# Patient Record
Sex: FEMALE | Race: BLACK OR AFRICAN AMERICAN | NOT HISPANIC OR LATINO | Employment: FULL TIME | URBAN - METROPOLITAN AREA
[De-identification: names, ages, dates, MRNs, and addresses within clinical notes are randomized per-mention and may not be internally consistent; named-entity substitution may affect disease eponyms.]

---

## 2018-06-14 ENCOUNTER — LAB VISIT (OUTPATIENT)
Dept: LAB | Facility: HOSPITAL | Age: 38
End: 2018-06-14
Attending: FAMILY MEDICINE
Payer: COMMERCIAL

## 2018-06-14 ENCOUNTER — OFFICE VISIT (OUTPATIENT)
Dept: INTERNAL MEDICINE | Facility: CLINIC | Age: 38
End: 2018-06-14
Payer: COMMERCIAL

## 2018-06-14 VITALS
SYSTOLIC BLOOD PRESSURE: 124 MMHG | TEMPERATURE: 99 F | HEART RATE: 131 BPM | HEIGHT: 67 IN | BODY MASS INDEX: 45.99 KG/M2 | RESPIRATION RATE: 18 BRPM | WEIGHT: 293 LBS | OXYGEN SATURATION: 99 % | DIASTOLIC BLOOD PRESSURE: 78 MMHG

## 2018-06-14 DIAGNOSIS — E66.01 SEVERE OBESITY (BMI >= 40): ICD-10-CM

## 2018-06-14 DIAGNOSIS — Z20.2 EXPOSURE TO STD: ICD-10-CM

## 2018-06-14 DIAGNOSIS — K21.9 GASTROESOPHAGEAL REFLUX DISEASE WITHOUT ESOPHAGITIS: ICD-10-CM

## 2018-06-14 DIAGNOSIS — R52 PAIN: ICD-10-CM

## 2018-06-14 DIAGNOSIS — B37.31 VAGINAL YEAST INFECTION: ICD-10-CM

## 2018-06-14 DIAGNOSIS — N76.0 ACUTE VAGINITIS: Primary | ICD-10-CM

## 2018-06-14 PROCEDURE — 86703 HIV-1/HIV-2 1 RESULT ANTBDY: CPT

## 2018-06-14 PROCEDURE — 86592 SYPHILIS TEST NON-TREP QUAL: CPT

## 2018-06-14 PROCEDURE — 99999 PR PBB SHADOW E&M-NEW PATIENT-LVL III: CPT | Mod: PBBFAC,,, | Performed by: FAMILY MEDICINE

## 2018-06-14 PROCEDURE — 36415 COLL VENOUS BLD VENIPUNCTURE: CPT | Mod: PO

## 2018-06-14 PROCEDURE — 99204 OFFICE O/P NEW MOD 45 MIN: CPT | Mod: S$GLB,,, | Performed by: FAMILY MEDICINE

## 2018-06-14 PROCEDURE — 3074F SYST BP LT 130 MM HG: CPT | Mod: CPTII,S$GLB,, | Performed by: FAMILY MEDICINE

## 2018-06-14 PROCEDURE — 3008F BODY MASS INDEX DOCD: CPT | Mod: CPTII,S$GLB,, | Performed by: FAMILY MEDICINE

## 2018-06-14 PROCEDURE — 3078F DIAST BP <80 MM HG: CPT | Mod: CPTII,S$GLB,, | Performed by: FAMILY MEDICINE

## 2018-06-14 PROCEDURE — 80074 ACUTE HEPATITIS PANEL: CPT

## 2018-06-14 RX ORDER — OMEPRAZOLE 20 MG/1
CAPSULE, DELAYED RELEASE ORAL
Refills: 1 | COMMUNITY
Start: 2018-04-29 | End: 2018-06-14 | Stop reason: SDUPTHER

## 2018-06-14 RX ORDER — FLUCONAZOLE 150 MG/1
150 TABLET ORAL DAILY
Qty: 1 TABLET | Refills: 2 | Status: SHIPPED | OUTPATIENT
Start: 2018-06-14 | End: 2018-06-15

## 2018-06-14 RX ORDER — METFORMIN HYDROCHLORIDE 500 MG/1
TABLET ORAL
Refills: 4 | COMMUNITY
Start: 2018-04-29 | End: 2018-07-05 | Stop reason: SDUPTHER

## 2018-06-14 RX ORDER — TRAMADOL HYDROCHLORIDE 50 MG/1
TABLET ORAL
Refills: 1 | COMMUNITY
Start: 2018-04-29 | End: 2018-06-14 | Stop reason: SDUPTHER

## 2018-06-14 RX ORDER — TRAMADOL HYDROCHLORIDE 50 MG/1
TABLET ORAL
Qty: 30 TABLET | Refills: 1 | Status: SHIPPED | OUTPATIENT
Start: 2018-06-14 | End: 2018-08-02 | Stop reason: SDUPTHER

## 2018-06-14 RX ORDER — OMEPRAZOLE 20 MG/1
20 CAPSULE, DELAYED RELEASE ORAL DAILY
Qty: 30 CAPSULE | Refills: 1 | Status: SHIPPED | OUTPATIENT
Start: 2018-06-14

## 2018-06-14 NOTE — PROGRESS NOTES
Subjective:       Patient ID: More Madrid is a 38 y.o. female.    Chief Complaint: Establish Care; Exposure to STD (Herpes); and Vaginitis    37 yo female here with concern about STD exposure. She has had unprotected sex with her partner who has recently been diagnosed with genital herpes after sex with a different partner. Ms. Madrid has not had any symptoms of herpes although she does feel like she may have a yeast infection. C/o increased discharge and itching.       Exposure to STD    The patient's primary symptoms include a discharge and genital itching. The patient's pertinent negatives include no genital lesions, genital rash or pelvic pain. This is a new problem. The current episode started in the past 7 days. The problem has been unchanged. The vaginal discharge was milky. Pertinent negatives include no abdominal pain, fever, genital odor or urinary frequency. She has tried nothing for the symptoms.      does not have any pertinent problems on file.  Past Medical History:   Diagnosis Date    Dysmenorrhea     Hypertension     Ovarian cyst      Past Surgical History:   Procedure Laterality Date    cyst removal from ovary       Family History   Problem Relation Age of Onset    Hypertension Mother     Hypertension Father     Cancer Maternal Aunt         lung, cervical     Social History     Social History    Marital status: Single     Spouse name: N/A    Number of children: 0    Years of education: N/A     Occupational History    [employer]     Social History Main Topics    Smoking status: Never Smoker    Smokeless tobacco: Not on file    Alcohol use Yes    Drug use: No    Sexual activity: Yes     Partners: Male     Birth control/ protection: None     Other Topics Concern    Not on file     Social History Narrative    Lives with mom     Review of Systems   Constitutional: Negative for chills and fever.   Gastrointestinal: Negative for abdominal pain.   Genitourinary: Positive for menstrual  problem and vaginal discharge. Negative for frequency, genital sores, pelvic pain, urgency and vaginal bleeding.   All other systems reviewed and are negative.      Objective:     Vitals:    06/14/18 1321   BP: 124/78   Pulse: (!) 131   Resp: 18   Temp: 98.9 °F (37.2 °C)        Physical Exam   Constitutional: She is oriented to person, place, and time. She appears well-developed and well-nourished.   HENT:   Head: Normocephalic and atraumatic.   Right Ear: External ear normal.   Left Ear: External ear normal.   Nose: Nose normal.   Eyes: Conjunctivae and EOM are normal. Pupils are equal, round, and reactive to light. No scleral icterus.   Genitourinary: Vaginal discharge found.   Genitourinary Comments: No lesions of vulva or at introitus; thick white vaginal discharge present   Neurological: She is alert and oriented to person, place, and time.   Normal gait. No speech abnormality   Psychiatric: She has a normal mood and affect. Her behavior is normal. Judgment and thought content normal.   Nursing note and vitals reviewed.      Assessment:       1. Acute vaginitis    2. Exposure to STD    3. Vaginal yeast infection    4. Pain    5. Gastroesophageal reflux disease without esophagitis    6. Severe obesity (BMI >= 40)        Plan:           Problem List Items Addressed This Visit     Severe obesity (BMI >= 40)      Other Visit Diagnoses     Acute vaginitis    -  Primary    Relevant Orders    HSV by Rapid PCR, Non-Blood [facility]; Vagina    Vaginal Screen Vagina (Completed)    Exposure to STD        Relevant Orders    HSV by Rapid PCR, Non-Blood [facility]; Vagina    C. trachomatis/N. gonorrhoeae by AMP DNA Urine    Hepatitis panel, acute    HIV-1 and HIV-2 antibodies    RPR    Vaginal yeast infection        Relevant Medications    fluconazole (DIFLUCAN) 150 MG Tab    Pain        Relevant Medications    traMADol (ULTRAM) 50 mg tablet    Gastroesophageal reflux disease without esophagitis        Relevant Medications     omeprazole (PRILOSEC) 20 MG capsule      Screening for STDs today. She would like to establish primary care. RTC 2 weeks for wellness visit.

## 2018-06-15 LAB
HAV IGM SERPL QL IA: NEGATIVE
HBV CORE IGM SERPL QL IA: NEGATIVE
HBV SURFACE AG SERPL QL IA: NEGATIVE
HCV AB SERPL QL IA: NEGATIVE
HIV 1+2 AB+HIV1 P24 AG SERPL QL IA: NEGATIVE
RPR SER QL: NORMAL

## 2018-06-21 ENCOUNTER — PATIENT OUTREACH (OUTPATIENT)
Dept: ADMINISTRATIVE | Facility: HOSPITAL | Age: 38
End: 2018-06-21

## 2018-07-05 ENCOUNTER — OFFICE VISIT (OUTPATIENT)
Dept: INTERNAL MEDICINE | Facility: CLINIC | Age: 38
End: 2018-07-05
Payer: COMMERCIAL

## 2018-07-05 ENCOUNTER — LAB VISIT (OUTPATIENT)
Dept: LAB | Facility: HOSPITAL | Age: 38
End: 2018-07-05
Attending: FAMILY MEDICINE
Payer: COMMERCIAL

## 2018-07-05 VITALS
SYSTOLIC BLOOD PRESSURE: 124 MMHG | DIASTOLIC BLOOD PRESSURE: 68 MMHG | TEMPERATURE: 97 F | WEIGHT: 293 LBS | HEIGHT: 67 IN | HEART RATE: 124 BPM | BODY MASS INDEX: 45.99 KG/M2 | OXYGEN SATURATION: 97 %

## 2018-07-05 DIAGNOSIS — E66.01 SEVERE OBESITY (BMI >= 40): ICD-10-CM

## 2018-07-05 DIAGNOSIS — Z13.220 SCREENING CHOLESTEROL LEVEL: ICD-10-CM

## 2018-07-05 DIAGNOSIS — Z12.4 CERVICAL CANCER SCREENING: Primary | ICD-10-CM

## 2018-07-05 DIAGNOSIS — Z13.1 SCREENING FOR DIABETES MELLITUS: ICD-10-CM

## 2018-07-05 DIAGNOSIS — E28.2 PCOS (POLYCYSTIC OVARIAN SYNDROME): ICD-10-CM

## 2018-07-05 DIAGNOSIS — Z20.2 EXPOSURE TO STD: ICD-10-CM

## 2018-07-05 DIAGNOSIS — E88.819 INSULIN RESISTANCE: ICD-10-CM

## 2018-07-05 DIAGNOSIS — I10 ESSENTIAL HYPERTENSION: ICD-10-CM

## 2018-07-05 DIAGNOSIS — R63.5 WEIGHT GAIN: ICD-10-CM

## 2018-07-05 DIAGNOSIS — B00.9 HERPES SIMPLEX TYPE 2 INFECTION: ICD-10-CM

## 2018-07-05 LAB
ALBUMIN SERPL BCP-MCNC: 3.2 G/DL
ALP SERPL-CCNC: 105 U/L
ALT SERPL W/O P-5'-P-CCNC: 37 U/L
ANION GAP SERPL CALC-SCNC: 8 MMOL/L
AST SERPL-CCNC: 37 U/L
BILIRUB SERPL-MCNC: 0.3 MG/DL
BUN SERPL-MCNC: 11 MG/DL
CALCIUM SERPL-MCNC: 9.8 MG/DL
CHLORIDE SERPL-SCNC: 102 MMOL/L
CHOLEST SERPL-MCNC: 175 MG/DL
CHOLEST/HDLC SERPL: 3.1 {RATIO}
CO2 SERPL-SCNC: 25 MMOL/L
CREAT SERPL-MCNC: 0.8 MG/DL
EST. GFR  (AFRICAN AMERICAN): >60 ML/MIN/1.73 M^2
EST. GFR  (NON AFRICAN AMERICAN): >60 ML/MIN/1.73 M^2
ESTIMATED AVG GLUCOSE: 206 MG/DL
GLUCOSE SERPL-MCNC: 238 MG/DL
HBA1C MFR BLD HPLC: 8.8 %
HDLC SERPL-MCNC: 57 MG/DL
HDLC SERPL: 32.6 %
LDLC SERPL CALC-MCNC: 75.2 MG/DL
NONHDLC SERPL-MCNC: 118 MG/DL
POTASSIUM SERPL-SCNC: 4.4 MMOL/L
PROT SERPL-MCNC: 7.4 G/DL
SODIUM SERPL-SCNC: 135 MMOL/L
TRIGL SERPL-MCNC: 214 MG/DL
TSH SERPL DL<=0.005 MIU/L-ACNC: 2.58 UIU/ML

## 2018-07-05 PROCEDURE — 3078F DIAST BP <80 MM HG: CPT | Mod: CPTII,S$GLB,, | Performed by: FAMILY MEDICINE

## 2018-07-05 PROCEDURE — 3074F SYST BP LT 130 MM HG: CPT | Mod: CPTII,S$GLB,, | Performed by: FAMILY MEDICINE

## 2018-07-05 PROCEDURE — 36415 COLL VENOUS BLD VENIPUNCTURE: CPT | Mod: PO

## 2018-07-05 PROCEDURE — 99395 PREV VISIT EST AGE 18-39: CPT | Mod: S$GLB,,, | Performed by: FAMILY MEDICINE

## 2018-07-05 PROCEDURE — 80053 COMPREHEN METABOLIC PANEL: CPT

## 2018-07-05 PROCEDURE — 99999 PR PBB SHADOW E&M-EST. PATIENT-LVL IV: CPT | Mod: PBBFAC,,, | Performed by: FAMILY MEDICINE

## 2018-07-05 PROCEDURE — 83036 HEMOGLOBIN GLYCOSYLATED A1C: CPT

## 2018-07-05 PROCEDURE — 80061 LIPID PANEL: CPT

## 2018-07-05 PROCEDURE — 84443 ASSAY THYROID STIM HORMONE: CPT

## 2018-07-05 RX ORDER — METFORMIN HYDROCHLORIDE 500 MG/1
500 TABLET ORAL 2 TIMES DAILY WITH MEALS
Qty: 60 TABLET | Refills: 6 | Status: SHIPPED | OUTPATIENT
Start: 2018-07-05 | End: 2018-07-10 | Stop reason: SDUPTHER

## 2018-07-05 RX ORDER — ACYCLOVIR 400 MG/1
TABLET ORAL
Refills: 0 | COMMUNITY
Start: 2018-06-23 | End: 2018-09-07

## 2018-07-05 NOTE — PROGRESS NOTES
Subjective:       Patient ID: More Madrid is a 38 y.o. female.    Chief Complaint: Follow-up    37 yo female here for wellness visit. She went to the ER due to first herpes outbreak. She has not had a pap smear recently. Interested in birth control but does not like medication. Wants an IUD if insurance will cover. Wants to lose weight--wants bariatric surgery. Has tried diet medication from a weight loss clinic and has also tried exercise and diet modification. Thinking of trying keto diet. 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        does not have any pertinent problems on file.  Past Medical History:   Diagnosis Date    Dysmenorrhea     Hypertension     Ovarian cyst      Past Surgical History:   Procedure Laterality Date    cyst removal from ovary       Family History   Problem Relation Age of Onset    Hypertension Mother     Hypertension Father     Cancer Maternal Aunt         lung, cervical     Social History     Social History    Marital status: Single     Spouse name: N/A    Number of children: 0    Years of education: N/A     Occupational History    [employer]     Social History Main Topics    Smoking status: Never Smoker    Smokeless tobacco: Not on file    Alcohol use Yes    Drug use: No    Sexual activity: Yes     Partners: Male     Birth control/ protection: None     Other Topics Concern    Not on file     Social History Narrative    Lives with mom     Review of Systems   Constitutional: Positive for fatigue. Negative for activity change and unexpected weight change.   HENT: Negative for hearing loss, rhinorrhea and trouble swallowing.    Eyes: Negative for discharge and visual disturbance.   Respiratory: Negative for chest tightness and wheezing.    Cardiovascular: Negative for chest pain and palpitations.   Gastrointestinal: Negative for blood in stool, constipation, diarrhea and vomiting.   Endocrine: Negative for polydipsia and polyuria.   Genitourinary:  Negative for difficulty urinating, dysuria, hematuria and menstrual problem.   Musculoskeletal: Negative for arthralgias, joint swelling and neck pain.   Neurological: Negative for weakness and headaches.   Psychiatric/Behavioral: Negative for confusion and dysphoric mood.       Objective:     Vitals:    07/05/18 0812   BP: 124/68   Pulse: (!) 124   Temp: 97.3 °F (36.3 °C)        Physical Exam   Constitutional: She is oriented to person, place, and time. She appears well-developed and well-nourished.   Morbidly obese   HENT:   Head: Normocephalic and atraumatic.   Right Ear: External ear normal.   Left Ear: External ear normal.   Nose: Nose normal.   Eyes: Conjunctivae and EOM are normal. Pupils are equal, round, and reactive to light. No scleral icterus.   Cardiovascular: Regular rhythm, normal heart sounds and intact distal pulses.    No murmur heard.  Pulmonary/Chest: Effort normal and breath sounds normal.   Musculoskeletal: Normal range of motion. She exhibits no edema or deformity.   Neurological: She is alert and oriented to person, place, and time.   Normal gait. No speech abnormality   Psychiatric: She has a normal mood and affect. Her behavior is normal. Judgment and thought content normal.   Nursing note and vitals reviewed.      Assessment:       1. Cervical cancer screening    2. Weight gain    3. Screening cholesterol level    4. Screening for diabetes mellitus    5. Insulin resistance    6. Severe obesity (BMI >= 40)    7. PCOS (polycystic ovarian syndrome)    8. Essential hypertension    9. Herpes simplex type 2 infection        Plan:           Problem List Items Addressed This Visit     Hypertension    Current Assessment & Plan     Normal BP today; not on antihypertensive therapy. Check CMP. Monitor BP. Monitor salt intake         PCOS (polycystic ovarian syndrome)    Current Assessment & Plan     Metformin refilled; advised that this may stimulate ovulation in anovulatory patients with PCOS and to  consider this as pregnancy is not desired.          Severe obesity (BMI >= 40)    Current Assessment & Plan     Info given on free group meeting on weight loss as well as info on bariatric surgery. Counseled on weight loss options.          Relevant Orders    TSH    Herpes simplex type 2 infection    Overview     First outbreak June 2018; diagnosed in [facility]         Current Assessment & Plan     Complete course of acyclovir; call or come in if additional outbreak occurs.          Insulin resistance    Relevant Medications    metFORMIN (GLUCOPHAGE) 500 MG tablet    Other Relevant Orders    TSH    Comprehensive metabolic panel      Other Visit Diagnoses     Cervical cancer screening    -  Primary    Relevant Orders    Ambulatory referral to Obstetrics / Gynecology    Weight gain        Screening cholesterol level        Relevant Orders    Lipid panel    Screening for diabetes mellitus        Relevant Orders    Hemoglobin A1c

## 2018-07-05 NOTE — PATIENT INSTRUCTIONS
Ask your OB/GYN about getting an IUD for birth control.      Deciding on Bariatric Surgery    Is excess weight affecting your life and your health? Bariatric surgery (also called obesity surgery) may help you reach a healthier weight. This surgery alters your digestive system. For the surgery to work, you must change your diet and lifestyle. In most cases, the surgery is not reversible. So if youre considering surgery, learn all you can about it before you decide. Bariatric surgery also has a number of potential risks and complications that you need to discuss with your surgeon.  Qualifying for surgery  Surgery is not for everyone. To qualify:  · You must have a BMI of 40 or more, or a BMI of 35 or more (see box below) plus a serious obesity-related health problem, such as type 2 diabetes, high blood pressure, or sleep apnea.  · You must be healthy enough to have surgery.  · You may be required to have a psychological evaluation.  · You must have tried to lose weight by other means, such as dieting.  Setting realistic expectations  The goal of bariatric surgery is to help you lose over half of your excess weight. This can improve or prevent health problems. This surgery is not done for cosmetic reasons. Keep in mind that:  · Other weight-loss methods should be tried first. Lifestyle changes, behavioral modifications, and prescription medicines are initial choices. Surgery is only a choice if other methods dont work.  · Surgery is meant to be permanent. You will need to change how you eat for the rest of your life.  · You must commit to eating less and being more active after surgery. If you dont, you will not lose or keep off the weight.  · You wont reach a healthy weight right away. Most weight is lost steadily during the first year or two after surgery.  · Most likely, you wont lose all your excess weight. But you can reach a much healthier weight.  Obesity is measured by a formula called body mass index  (BMI), which is based on your height and weight. A healthy BMI is about 18 to 25. A BMI of 30 or more signals obesity. A BMI of 40 or more reflects severe (morbid) obesity.   Resources  · American Society for Metabolic and Bariatric Surgerywww.asmbs.org  · National Heart, Lung, and Blood [place] Obesity Education Initiativewww.nhlbi.nih.gov/health/public/heart/obesity/lose_wt  Date Last Reviewed: 2/17/2016  © 5389-5551 [employer]. [address] [place], [place] [id number]. All rights reserved. This information is not intended as a substitute for professional medical care. Always follow your healthcare professional's instructions.

## 2018-07-05 NOTE — ASSESSMENT & PLAN NOTE
Info given on free group meeting on weight loss as well as info on bariatric surgery. Counseled on weight loss options.

## 2018-07-05 NOTE — ASSESSMENT & PLAN NOTE
Metformin refilled; advised that this may stimulate ovulation in anovulatory patients with PCOS and to consider this as pregnancy is not desired.

## 2018-07-10 DIAGNOSIS — E11.9 TYPE 2 DIABETES MELLITUS WITHOUT COMPLICATION, WITHOUT LONG-TERM CURRENT USE OF INSULIN: Primary | ICD-10-CM

## 2018-07-10 DIAGNOSIS — E88.819 INSULIN RESISTANCE: ICD-10-CM

## 2018-07-10 RX ORDER — METFORMIN HYDROCHLORIDE 500 MG/1
1000 TABLET ORAL 2 TIMES DAILY WITH MEALS
Qty: 60 TABLET | Refills: 6 | Status: SHIPPED | OUTPATIENT
Start: 2018-07-10 | End: 2018-07-11 | Stop reason: SDUPTHER

## 2018-07-10 NOTE — PROGRESS NOTES
Spoke with patient to discuss new diagnosis of diabetes. She will increase metformin to 1000 mg BID and will refer to diabetes education program for education.

## 2018-07-11 ENCOUNTER — TELEPHONE (OUTPATIENT)
Dept: DIABETES | Facility: CLINIC | Age: 38
End: 2018-07-11

## 2018-07-11 ENCOUNTER — OFFICE VISIT (OUTPATIENT)
Dept: OBSTETRICS AND GYNECOLOGY | Facility: CLINIC | Age: 38
End: 2018-07-11
Payer: COMMERCIAL

## 2018-07-11 VITALS
DIASTOLIC BLOOD PRESSURE: 84 MMHG | WEIGHT: 293 LBS | SYSTOLIC BLOOD PRESSURE: 130 MMHG | BODY MASS INDEX: 45.99 KG/M2 | HEIGHT: 67 IN

## 2018-07-11 DIAGNOSIS — N92.6 IRREGULAR MENSES: Primary | ICD-10-CM

## 2018-07-11 DIAGNOSIS — B37.31 YEAST VAGINITIS: ICD-10-CM

## 2018-07-11 DIAGNOSIS — E28.2 PCOS (POLYCYSTIC OVARIAN SYNDROME): ICD-10-CM

## 2018-07-11 DIAGNOSIS — E11.9 TYPE 2 DIABETES MELLITUS WITHOUT COMPLICATION, WITHOUT LONG-TERM CURRENT USE OF INSULIN: ICD-10-CM

## 2018-07-11 PROCEDURE — 99204 OFFICE O/P NEW MOD 45 MIN: CPT | Mod: S$GLB,,, | Performed by: NURSE PRACTITIONER

## 2018-07-11 PROCEDURE — 81025 URINE PREGNANCY TEST: CPT | Mod: S$GLB,,, | Performed by: NURSE PRACTITIONER

## 2018-07-11 PROCEDURE — 99999 PR PBB SHADOW E&M-EST. PATIENT-LVL III: CPT | Mod: PBBFAC,,, | Performed by: NURSE PRACTITIONER

## 2018-07-11 PROCEDURE — 3075F SYST BP GE 130 - 139MM HG: CPT | Mod: CPTII,S$GLB,, | Performed by: NURSE PRACTITIONER

## 2018-07-11 PROCEDURE — 3008F BODY MASS INDEX DOCD: CPT | Mod: CPTII,S$GLB,, | Performed by: NURSE PRACTITIONER

## 2018-07-11 PROCEDURE — 87210 SMEAR WET MOUNT SALINE/INK: CPT | Mod: QW,S$GLB,, | Performed by: NURSE PRACTITIONER

## 2018-07-11 PROCEDURE — 3079F DIAST BP 80-89 MM HG: CPT | Mod: CPTII,S$GLB,, | Performed by: NURSE PRACTITIONER

## 2018-07-11 RX ORDER — MEDROXYPROGESTERONE ACETATE 10 MG/1
10 TABLET ORAL DAILY
Qty: 10 TABLET | Refills: 12 | Status: SHIPPED | OUTPATIENT
Start: 2018-07-11 | End: 2018-07-11 | Stop reason: SDUPTHER

## 2018-07-11 RX ORDER — METFORMIN HYDROCHLORIDE 500 MG/1
1000 TABLET ORAL 2 TIMES DAILY WITH MEALS
Qty: 60 TABLET | Refills: 6 | Status: SHIPPED | OUTPATIENT
Start: 2018-07-11 | End: 2018-08-02

## 2018-07-11 RX ORDER — NYSTATIN AND TRIAMCINOLONE ACETONIDE 100000; 1 [USP'U]/G; MG/G
CREAM TOPICAL
Qty: 30 G | Refills: 1 | Status: SHIPPED | OUTPATIENT
Start: 2018-07-11 | End: 2018-09-07

## 2018-07-11 RX ORDER — MEDROXYPROGESTERONE ACETATE 10 MG/1
10 TABLET ORAL DAILY
Qty: 10 TABLET | Refills: 12 | Status: SHIPPED | OUTPATIENT
Start: 2018-07-11 | End: 2018-07-21

## 2018-07-11 RX ORDER — FLUCONAZOLE 150 MG/1
TABLET ORAL
Qty: 2 TABLET | Refills: 1 | Status: SHIPPED | OUTPATIENT
Start: 2018-07-11 | End: 2018-09-07

## 2018-07-11 RX ORDER — FLUCONAZOLE 150 MG/1
TABLET ORAL
Qty: 2 TABLET | Refills: 1 | Status: SHIPPED | OUTPATIENT
Start: 2018-07-11 | End: 2018-07-11 | Stop reason: SDUPTHER

## 2018-07-11 RX ORDER — NYSTATIN AND TRIAMCINOLONE ACETONIDE 100000; 1 [USP'U]/G; MG/G
CREAM TOPICAL
Qty: 30 G | Refills: 1 | Status: SHIPPED | OUTPATIENT
Start: 2018-07-11 | End: 2018-07-11 | Stop reason: SDUPTHER

## 2018-07-11 NOTE — PATIENT INSTRUCTIONS
Polycystic Ovary Syndrome  Polycystic ovary syndrome (PCOS) causes harmless, small cysts in the ovaries and other symptoms. PCOS is caused by certain hormones being out of balance. The word syndrome means a group of symptoms. Women with PCOS may have no periods, irregular periods, or very long periods.    Your ovaries  Women store their eggs in their ovaries. Each egg is in a capsule called a follicle. Normally during the reproductive years, one follicle grows to produce a mature egg each month. This egg is released during ovulation and the follicle dissolves.  Hormones out of balance  With polycystic ovary syndrome (PCOS), the hormones that control ovulation are out of balance. These include estrogen, progesterones, and androgen. As a result, ovulation may not occur. Instead, the follicle stays enlarged. This is a fluid-filled sac (cyst). Over time, the ovaries fill with many small cysts. This is why they are called poly (many) cystic ovaries. In some women, the ovaries also make too much androgen.  PCOS symptoms  Women with PCOS may also have one or more of these symptoms:  · Trouble getting pregnant (fertility problems)  · Weight gain, especially around the waist   · Acne  · Hair growth on the face and other parts of the body  · Patches of thickened, velvety, darkened skin called acanthosis nigricans  Women with PCOS are also at increased risk of developing cancer of the uterine lining, diabetes, and heart disease.   Date Last Reviewed: 5/10/2015  © 7517-4867 [employer]. [address] [place] [address]. All rights reserved. This information is not intended as a substitute for professional medical care. Always follow your healthcare professional's instructions.        Understanding Periods  Having a period is a normal, healthy part of becoming and being a woman. A period is the result of a cycle that takes place inside a girls body. This menstrual cycle makes it possible for women  to have babies. The cycle begins with the first day of bleeding. In the middle of the cycle, ovulation occurs. This is when an egg is released and begins its journey from the ovary to the uterus.    An egg is released  · During each cycle, 1 egg grows and is released from an ovary. It finds its way to the fallopian tube.  The egg travels through a tube  · The egg moves through the fallopian tube toward the uterus. (If the egg and a mans sperm meet here, a woman becomes pregnant.)  The lining thickens  · The lining of the uterus grows thicker. This lining is made up of blood, tissue, and fluid. (The lining will nourish a growing baby during pregnancy.)  The egg and lining are shed  · About once a month, the egg and the lining of the uterus are shed through the vagina. This is called a period. (A period does not happen during pregnancy.)  Date Last Reviewed: 5/9/2015  © 7645-7200 [employer]. [address] [place] [address], [place] [address]. All rights reserved. This information is not intended as a substitute for professional medical care. Always follow your healthcare professional's instructions.        Understanding Uterine Bleeding  Your uterine bleeding may be heavy. Or you may have bleeding between periods. These problems may be caused by hormonal imbalance. Or they can be caused by uterine growths, an intrauterine device (IUD), bleeding disorder, or pregnancy.  Hormonal imbalance  Your menstrual cycle is controlled by hormones. The hormones include estrogen and progesterone. Sometimes there is too much or too little of 1 or both of these hormones. This can cause heavy periods. Or it can cause bleeding between periods. Causes of hormonal imbalance can include:  · Hormonal changes in teens and in women nearing menopause  · Diabetes, thyroid disease, or other medical problems  · Obesity  · Stress  · Strenuous exercise  · Anorexia (an eating disorder)  · Pregnancy  Uterine growths  There are different kinds  of uterine growths. These include:  · Fibroids. These are round knots of muscle tissue in the uterus.  · Polyps. These are soft tissue growths in the uterine lining. They often hang into the uterus.  · Adenomyosis. This is when the uterine lining grows into the muscle wall.  · Hyperplasia. This is when the uterine lining gets too thick or grows too much.  · Endometrial cancer. This is uncontrolled growth of part of the uterine lining.  Other causes of uterine bleeding  There are other causes of uterine bleeding. These include:  · IUD (intrauterine device). This is a method of birth control. Some IUDs contain hormones.  · Bleeding disorders. This is when the blood can't clot properly.  Treatment  Your health care provider can help diagnose the cause of your bleeding problem. He or she will work then work with you to plan treatment as needed.  Date Last Reviewed: 7/6/2015  © 1375-4584 [employer]. [address] [place] [address]. All rights reserved. This information is not intended as a substitute for professional medical care. Always follow your healthcare professional's instructions.        Vaginal Infection: Yeast (Candidiasis)  Yeast infection occurs when yeast in the vagina increase and attacks the vaginal tissues. Yeast is a type of fungus. These infections are often caused by a type of yeast called Candida albicans. Other species of yeast can also cause infections. Factors that may make infection more likely include recent antibiotic use, douching, or increased sex. Yeast infections are more common in women who have diabetes, or are obese or pregnant, or have a weak immune system.  Symptoms of yeast infection  · Clumpy or thin, white discharge, which may look like cottage cheese  · No odor or minimal odor  · Severe vaginal itching or burning  · Burning with urination  · Swelling, redness of vulva  · Pain during sex  Treating yeast infection  Yeast infection is treated with a vaginal  antifungal cream. In some cases, antifungal pills are prescribed instead. During treatment:  · Finish all of your medicine, even if your symptoms go away.  · Apply the cream before going to bed. Lie flat after applying so that it doesn't drip out.  · Do not douche or use tampons.  · Don't rely on a diaphragm or condoms, since the cream may weaken them.  · Avoid intercourse if advised by your healthcare provider.     Should I treat a yeast infection myself?  Discuss with your healthcare provider whether you should use over-the-counter medicines to treat a yeast infection. Self-treatment may depend on whether:  · You've had a yeast infection in the past.  · You're at risk for STDs.  Call your healthcare provider if symptoms do not go away or come back after treatment.   Date Last Reviewed: 3/1/2017  © 9186-7214 [employer]. [address] [place] [address]. All rights reserved. This information is not intended as a substitute for professional medical care. Always follow your healthcare professional's instructions.        Vaginal Infection: Understanding the Vaginal Environment  The vagina is a canal. It connects the uterus (womb) to the outside of the body. It is home to many types of bacteria and other tiny organisms. These different bacteria most often stay balanced in number. This keeps the vagina healthy. If the balance changes, it can cause infection.   A healthy environment  Many types of bacteria are present in a healthy vagina. When balanced, they dont cause problems. Small amounts of yeast may also be present without causing problems. The most common type of bacteria in the vagina is lactobacillus. It helps keep the vagina at a low pH. A low pH keeps bad bacteria from taking over.  Normal vaginal discharge  The vagina makes fluid. It is sent out as discharge. This also keeps the vagina healthy. Normal discharge can be clear, white, or yellowish. Most women find that normal discharge  varies in amount and color through the month.  An unhealthy environment  The vaginal environment may get out of balance. This may result in a vaginal infection. There are a few reasons this can happen. The pH may have changed. The amount of one organism, such as yeast, may increase. Or an outside organism may get into the vagina and throw off the balance:  · Bacterial vaginosis (BV). BV is due to an imbalance in the normal bacteria in the vagina. Lactobacillus bacteria decrease. As a result, the numbers of bad bacteria increase.  · Candidiasis (yeast infection). Yeast is a type of fungus. A yeast infection occurs when yeast cells in the vagina increase. They then attack vaginal tissues. A type of yeast called Candida albicans is often involved.  · Trichomoniasis (trich). Trich is a parasite. It is passed from one person to another during sex. Men with trich often dont have any symptoms. In women, it can take weeks or months before symptoms appear.  Date Last Reviewed: 3/1/2017  © 6488-6932 [employer]. [address] [place] [address]. All rights reserved. This information is not intended as a substitute for professional medical care. Always follow your healthcare professional's instructions.        Vaginal Infection: Understanding the Vaginal Environment  The vagina is a canal. It connects the uterus (womb) to the outside of the body. It is home to many types of bacteria and other tiny organisms. These different bacteria most often stay balanced in number. This keeps the vagina healthy. If the balance changes, it can cause infection.   A healthy environment  Many types of bacteria are present in a healthy vagina. When balanced, they dont cause problems. Small amounts of yeast may also be present without causing problems. The most common type of bacteria in the vagina is lactobacillus. It helps keep the vagina at a low pH. A low pH keeps bad bacteria from taking over.  Normal vaginal  discharge  The vagina makes fluid. It is sent out as discharge. This also keeps the vagina healthy. Normal discharge can be clear, white, or yellowish. Most women find that normal discharge varies in amount and color through the month.  An unhealthy environment  The vaginal environment may get out of balance. This may result in a vaginal infection. There are a few reasons this can happen. The pH may have changed. The amount of one organism, such as yeast, may increase. Or an outside organism may get into the vagina and throw off the balance:  · Bacterial vaginosis (BV). BV is due to an imbalance in the normal bacteria in the vagina. Lactobacillus bacteria decrease. As a result, the numbers of bad bacteria increase.  · Candidiasis (yeast infection). Yeast is a type of fungus. A yeast infection occurs when yeast cells in the vagina increase. They then attack vaginal tissues. A type of yeast called Candida albicans is often involved.  · Trichomoniasis (trich). Trich is a parasite. It is passed from one person to another during sex. Men with trich often dont have any symptoms. In women, it can take weeks or months before symptoms appear.  Date Last Reviewed: 3/1/2017  © 5310-1146 [employer]. [address] [place] [address], [place] [address]. All rights reserved. This information is not intended as a substitute for professional medical care. Always follow your healthcare professional's instructions.

## 2018-07-11 NOTE — TELEPHONE ENCOUNTER
Meds need to be sent to new pharmacy that's on file due to insurance purposes . Please advise./srb

## 2018-07-11 NOTE — PROGRESS NOTES
"  More Madrid is a 38 y.o. female  presents for well woman exam - but appears had gyn visit at [facility] with gyn there for this year, will get her records. She states has been seeing them over the last few years.  Even was suppose to have IUD inserted but her insurance termed and new insurance no longer going there.   LMP: No LMP recorded (approximate)..    Pt has been seen by me in  has hx of PCOS and irregular menses.  LMP was in May, sexually active with no birth control at this time not wanting pregnancy.  Still having episodes of no cycle for many months then when she has cycle she will have prolonged heavy bleeding.   Also has discharge and vaginal irritation and itching today.     Past Medical History:   Diagnosis Date    Diabetes mellitus     Dysmenorrhea     Herpes simplex virus (HSV) infection     Hypertension     Ovarian cyst     PCOS (polycystic ovarian syndrome)      Past Surgical History:   Procedure Laterality Date    cyst removal from ovary       Social History     Social History    Marital status: Single     Spouse name: N/A    Number of children: 0    Years of education: N/A     Occupational History    [employer]     Social History Main Topics    Smoking status: Never Smoker    Smokeless tobacco: Never Used    Alcohol use Yes      Comment: occ     Drug use: No    Sexual activity: Yes     Partners: Male     Birth control/ protection: None     Other Topics Concern    Not on file     Social History Narrative    Lives with mom     Family History   Problem Relation Age of Onset    Hypertension Mother     Hypertension Father     Cancer Maternal Aunt         lung, cervical     OB History      Para Term  AB Living    0              SAB TAB Ectopic Multiple Live Births                       /84   Ht 5' 7" (1.702 m)   Wt (!) 143.1 kg (315 lb 7.7 oz)   LMP  (Approximate)   BMI 49.41 kg/m²       ROS:  GENERAL: Denies weight gain or weight loss. Feeling well " overall.   SKIN: Denies rash or lesions.   HEAD: Denies head injury or headache.   NODES: Denies enlarged lymph nodes.   CHEST: Denies chest pain or shortness of breath.   CARDIOVASCULAR: Denies palpitations or left sided chest pain.   ABDOMEN: No abdominal pain, constipation, diarrhea, nausea, vomiting or rectal bleeding.   URINARY: No frequency, dysuria, hematuria, or burning on urination.  REPRODUCTIVE: See HPI.   BREASTS: The patient performs breast self-examination and denies pain, lumps, or nipple discharge.   HEMATOLOGIC: No easy bruisability or excessive bleeding.   MUSCULOSKELETAL: Denies joint pain or swelling.   NEUROLOGIC: Denies syncope or weakness.   PSYCHIATRIC: Denies depression, anxiety or mood swings.    PHYSICAL EXAM:  APPEARANCE: Well nourished, well developed, in no acute distress.  AFFECT: WNL, alert and oriented x 3  SKIN: No acne or hirsutism  PELVIC: Normal external genitalia without lesions.  Normal hair distribution.  Adequate perineal body, normal urethral meatus.  Vagina moist and well rugated without lesions or discharge.  Cervix pink, without lesions, discharge or tenderness.  No significant cystocele or rectocele.  Bimanual exam shows uterus to be normal size, regular, mobile and nontender.  Adnexa without masses or tenderness.    EXTREMITIES: No edema.  Physical Exam    1. Irregular menses  POCT Urine Pregnancy    medroxyPROGESTERone (PROVERA) 10 MG tablet   2. PCOS (polycystic ovarian syndrome)  POCT Urine Pregnancy    medroxyPROGESTERone (PROVERA) 10 MG tablet   3. Yeast vaginitis  POCT Wet Prep    fluconazole (DIFLUCAN) 150 MG Tab    nystatin-triamcinolone (MYCOLOG II) cream    AND PLAN:  upt negative in office today   Get records from [facility]  poct wet prep with yeast   Start provera today and take for 10 days, repeating once a month starting on 11th of each month   RTC 6 mths to see how cycles are doing

## 2018-07-11 NOTE — LETTER
July 11, 2018      Beba Luu MD  [address] [facility] [address] [place] [address]           [facility]  [address] [place] [place] [address]  Phone: [phone]  Fax: [phone]          Patient: More Madrid   MR Number: [id number]   YOB: 1980   Date of Visit: 7/11/2018       Dear Dr. Beba Luu:    Thank you for referring More Madrid to me for evaluation. Attached you will find relevant portions of my assessment and plan of care.    If you have questions, please do not hesitate to call me. I look forward to following More Madrid along with you.    Sincerely,    Anika Sarkar, NP    Enclosure  CC:  No Recipients    If you would like to receive this communication electronically, please contact [email or web address] or [phone] to request more information on [employer] access.    For providers and/or their staff who would like to refer a patient to [facility], please contact us through our one-stop-shop provider referral line, [facility] , at [phone].    If you feel you have received this communication in error or would no longer like to receive these types of communications, please e-mail [email or web address]

## 2018-07-11 NOTE — TELEPHONE ENCOUNTER
Attempted to contact pt to schedule an appt with diabetes education. No answer. Left vm to return call.

## 2018-07-11 NOTE — TELEPHONE ENCOUNTER
----- Message from Beba Luu MD sent at 7/10/2018  3:29 PM CDT -----  I spoke with More on the phone to discuss new diagnosis of diabetes. Referral entered for diabetes education program. Please coordinate this appointment.

## 2018-07-16 ENCOUNTER — TELEPHONE (OUTPATIENT)
Dept: INTERNAL MEDICINE | Facility: CLINIC | Age: 38
End: 2018-07-16

## 2018-07-20 DIAGNOSIS — E11.9 TYPE 2 DIABETES MELLITUS WITHOUT COMPLICATION: ICD-10-CM

## 2018-07-31 ENCOUNTER — TELEPHONE (OUTPATIENT)
Dept: DIABETES | Facility: CLINIC | Age: 38
End: 2018-07-31

## 2018-07-31 NOTE — TELEPHONE ENCOUNTER
----- Message from Nik Douglass sent at 7/31/2018  9:22 AM CDT -----  Contact: pt  She's calling in regards to rescheduling her class, [phone] (home)

## 2018-08-02 ENCOUNTER — OFFICE VISIT (OUTPATIENT)
Dept: INTERNAL MEDICINE | Facility: CLINIC | Age: 38
End: 2018-08-02
Payer: COMMERCIAL

## 2018-08-02 ENCOUNTER — HOSPITAL ENCOUNTER (OUTPATIENT)
Dept: RADIOLOGY | Facility: HOSPITAL | Age: 38
Discharge: HOME | End: 2018-08-02
Attending: FAMILY MEDICINE
Payer: COMMERCIAL

## 2018-08-02 VITALS
DIASTOLIC BLOOD PRESSURE: 78 MMHG | HEIGHT: 67 IN | OXYGEN SATURATION: 97 % | TEMPERATURE: 97 F | SYSTOLIC BLOOD PRESSURE: 118 MMHG | HEART RATE: 115 BPM | BODY MASS INDEX: 45.99 KG/M2 | WEIGHT: 293 LBS

## 2018-08-02 DIAGNOSIS — R52 PAIN: ICD-10-CM

## 2018-08-02 DIAGNOSIS — R10.32 LEFT GROIN PAIN: Primary | ICD-10-CM

## 2018-08-02 DIAGNOSIS — R10.32 LEFT GROIN PAIN: ICD-10-CM

## 2018-08-02 DIAGNOSIS — E11.9 TYPE 2 DIABETES MELLITUS WITHOUT COMPLICATION, WITHOUT LONG-TERM CURRENT USE OF INSULIN: ICD-10-CM

## 2018-08-02 PROCEDURE — 3074F SYST BP LT 130 MM HG: CPT | Mod: CPTII,S$GLB,, | Performed by: FAMILY MEDICINE

## 2018-08-02 PROCEDURE — 99999 PR PBB SHADOW E&M-EST. PATIENT-LVL III: CPT | Mod: PBBFAC,,, | Performed by: FAMILY MEDICINE

## 2018-08-02 PROCEDURE — 3078F DIAST BP <80 MM HG: CPT | Mod: CPTII,S$GLB,, | Performed by: FAMILY MEDICINE

## 2018-08-02 PROCEDURE — 73502 X-RAY EXAM HIP UNI 2-3 VIEWS: CPT | Mod: TC,FY,PO,LT

## 2018-08-02 PROCEDURE — 99214 OFFICE O/P EST MOD 30 MIN: CPT | Mod: S$GLB,,, | Performed by: FAMILY MEDICINE

## 2018-08-02 PROCEDURE — 3008F BODY MASS INDEX DOCD: CPT | Mod: CPTII,S$GLB,, | Performed by: FAMILY MEDICINE

## 2018-08-02 PROCEDURE — 3045F PR MOST RECENT HEMOGLOBIN A1C LEVEL 7.0-9.0%: CPT | Mod: CPTII,S$GLB,, | Performed by: FAMILY MEDICINE

## 2018-08-02 PROCEDURE — 73502 X-RAY EXAM HIP UNI 2-3 VIEWS: CPT | Mod: 26,LT,, | Performed by: RADIOLOGY

## 2018-08-02 RX ORDER — RAMIPRIL 2.5 MG/1
2.5 CAPSULE ORAL DAILY
Qty: 90 CAPSULE | Refills: 3 | Status: SHIPPED | OUTPATIENT
Start: 2018-08-02 | End: 2019-08-02

## 2018-08-02 RX ORDER — INSULIN GLARGINE 100 [IU]/ML
20 INJECTION, SOLUTION SUBCUTANEOUS NIGHTLY
Qty: 6 ML | Refills: 11 | Status: SHIPPED | OUTPATIENT
Start: 2018-08-02 | End: 2018-08-14

## 2018-08-02 RX ORDER — TRAMADOL HYDROCHLORIDE 50 MG/1
TABLET ORAL
Qty: 30 TABLET | Refills: 1 | Status: SHIPPED | OUTPATIENT
Start: 2018-08-02

## 2018-08-02 RX ORDER — SIMVASTATIN 20 MG/1
20 TABLET, FILM COATED ORAL NIGHTLY
Qty: 30 TABLET | Refills: 6 | Status: SHIPPED | OUTPATIENT
Start: 2018-08-02

## 2018-08-02 RX ORDER — BLOOD SUGAR DIAGNOSTIC
1 STRIP MISCELLANEOUS DAILY
Qty: 50 EACH | Refills: 3 | Status: SHIPPED | OUTPATIENT
Start: 2018-08-02

## 2018-08-02 RX ORDER — INSULIN PUMP SYRINGE, 3 ML
1 EACH MISCELLANEOUS 2 TIMES DAILY
Qty: 1 EACH | Refills: 0 | Status: SHIPPED | OUTPATIENT
Start: 2018-08-02 | End: 2019-08-02

## 2018-08-02 RX ORDER — ASPIRIN 81 MG/1
81 TABLET ORAL DAILY
Refills: 0
Start: 2018-08-02 | End: 2019-08-02

## 2018-08-02 NOTE — PROGRESS NOTES
Subjective:       Patient ID: More Madrid is a 38 y.o. female.    Chief Complaint: fmla paperwork    39 yo female here with complaint of left groin pain that is worst with weight bearing and movement, such as turning in bed. She has had this problem in the past and it comes and goes. This time the pain has been present longer than usual. It started this episode about 3 weeks ago.       Hip Pain    The incident occurred more than 1 week ago. There was no injury mechanism. The pain is present in the left hip. The quality of the pain is described as shooting. The pain is at a severity of 10/10. The pain is severe. The pain has been intermittent since onset. Pertinent negatives include no inability to bear weight, loss of motion, loss of sensation, muscle weakness or numbness. The symptoms are aggravated by movement and weight bearing. She has tried rest for the symptoms.     She was also recently diagnosed with diabetes; she is having incapacitating diarrhea on metformin and has had to miss 1/2 day of work due to this (requests [facility] paperwork completed to cover this day). She is unable to continue taking this medication.      does not have any pertinent problems on file.  Past Medical History:   Diagnosis Date    Diabetes mellitus     Dysmenorrhea     Herpes simplex virus (HSV) infection     Hypertension     Ovarian cyst     PCOS (polycystic ovarian syndrome)      Past Surgical History:   Procedure Laterality Date    cyst removal from ovary       Family History   Problem Relation Age of Onset    Hypertension Mother     Hypertension Father     Cancer Maternal Aunt         lung, cervical     Social History     Social History    Marital status: Single     Spouse name: N/A    Number of children: 0    Years of education: N/A     Occupational History    [employer]     Social History Main Topics    Smoking status: Never Smoker    Smokeless tobacco: Never Used    Alcohol use Yes      Comment: occ      Drug use: No    Sexual activity: Yes     Partners: Male     Birth control/ protection: None     Other Topics Concern    Not on file     Social History Narrative    Lives with mom     Review of Systems   Constitutional: Negative for fatigue and unexpected weight change.   HENT: Negative for hearing loss and sore throat.    Eyes: Negative for pain and visual disturbance.   Respiratory: Negative for cough and shortness of breath.    Cardiovascular: Negative for chest pain and palpitations.   Gastrointestinal: Negative for abdominal pain, constipation and diarrhea.   Genitourinary: Negative for difficulty urinating, dysuria and vaginal discharge.   Musculoskeletal: Negative for arthralgias and myalgias.   Skin: Negative for rash and wound.   Neurological: Negative for light-headedness, numbness and headaches.   Hematological: Negative.        Objective:     Vitals:    08/02/18 0834   BP: 118/78   Pulse: (!) 115   Temp: 97.2 °F (36.2 °C)        Physical Exam   Constitutional: She is oriented to person, place, and time. She appears well-developed and well-nourished.   HENT:   Head: Normocephalic and atraumatic.   Eyes: EOM are normal. Pupils are equal, round, and reactive to light.   Neck: Normal range of motion. Neck supple.   Cardiovascular: Normal rate and regular rhythm.    Pulmonary/Chest: Effort normal and breath sounds normal.   Musculoskeletal: Normal range of motion. She exhibits no deformity.   Neurological: She is alert and oriented to person, place, and time.   Skin: Skin is warm and dry.   Psychiatric: She has a normal mood and affect. Her behavior is normal.   Nursing note and vitals reviewed.      Assessment:       1. Left groin pain    2. Pain    3. Type 2 diabetes mellitus without complication, without long-term current use of insulin        Plan:           Problem List Items Addressed This Visit     Type 2 diabetes mellitus without complication, without long-term current use of insulin    Relevant  "Medications    insulin glargine (LANTUS SOLOSTAR) 100 unit/mL (3 mL) InPn pen    pen needle, diabetic 32 gauge x 3/16" Ndle    blood sugar diagnostic Strp    blood glucose control, normal Soln    simvastatin (ZOCOR) 20 MG tablet    ramipril (ALTACE) 2.5 MG capsule    aspirin (ECOTRIN) 81 MG EC tablet    Other Relevant Orders    MICROALBUMIN / CREATININE RATIO URINE      Other Visit Diagnoses     Left groin pain    -  Primary    Relevant Orders    X-Ray Hip 2 View Left    Pain        Relevant Medications    traMADol (ULTRAM) 50 mg tablet      Discussed options with diabetes as well as insurance coverage of various medications. She elects to start long-acting insulin. Already referred to diabetic education with appointment coming up this week. Started ACEI, statin, and low dose ASA. Check microalbumin today then will be up to date on diabetes monitoring.     Xray of hip today; tramadol PRN. May need referral to ortho pending results which we discussed. RTC 3 months  "

## 2018-08-02 NOTE — PATIENT INSTRUCTIONS
Your hemoglobin A1c goal will be below 7.0%. Check your blood sugar once in the morning and once after a meal and write these numbers down. The goal for your fasting blood sugar first thing in the morning is between 80 and 130 mg/dL; the goal for your blood sugar after eating is below 180 mg/dl; Let me know of any blood sugar 70 or below. If you have symptoms of low blood sugar such as feeling shaky, confused, or weak drink some juice or have something to eat immediately. Maintaining an ideal body weight with BMI below 25 will help with your blood sugar control.

## 2018-08-08 ENCOUNTER — TELEPHONE (OUTPATIENT)
Dept: INTERNAL MEDICINE | Facility: CLINIC | Age: 38
End: 2018-08-08

## 2018-08-08 NOTE — TELEPHONE ENCOUNTER
Per fax request received from pt's pharmacy, initiated prior auth request to pt's insurance for Easy Touch Pen Needle 28Mh6rs rx, submitted online via [email or web address] Request [id number]; returned fax to pt's pharmacy notifying them PA request intitiated and will alert them when we receive a response from pt's insurance, fax transmission confirmed F#[phone].

## 2018-08-08 NOTE — TELEPHONE ENCOUNTER
Per fax request received from pt's pharmacy, initiated prior auth request to pt's insurance for Lantus rx, submitted online via [email or web address] Request [id number]; returned fax to pt's pharmacy notifying them PA request intitiated and will alert them when we receive a response from pt's insurance, fax transmission confirmed F#[phone].

## 2018-08-14 ENCOUNTER — TELEPHONE (OUTPATIENT)
Dept: INTERNAL MEDICINE | Facility: CLINIC | Age: 38
End: 2018-08-14

## 2018-08-14 RX ORDER — INSULIN GLARGINE 100 [IU]/ML
20 INJECTION, SOLUTION SUBCUTANEOUS NIGHTLY
Qty: 6 ML | Refills: 11 | Status: SHIPPED | OUTPATIENT
Start: 2018-08-14 | End: 2018-09-07

## 2018-08-15 ENCOUNTER — TELEPHONE (OUTPATIENT)
Dept: INTERNAL MEDICINE | Facility: CLINIC | Age: 38
End: 2018-08-15

## 2018-08-15 NOTE — TELEPHONE ENCOUNTER
S/w Pharm Tech @ [facility] and confirmed they received Lantus e-rx, their system showing PA required for rx.

## 2018-08-20 NOTE — TELEPHONE ENCOUNTER
See documentation 08/14/18 - Rx changed to Basaglar, covered under pt's insurance. Returned fax to pt's pharmacy w/ instructions confirming change to Basaglar insulin rx F#[phone] fax transmission confirmed.

## 2018-09-07 ENCOUNTER — OFFICE VISIT (OUTPATIENT)
Dept: INTERNAL MEDICINE | Facility: CLINIC | Age: 38
End: 2018-09-07
Payer: COMMERCIAL

## 2018-09-07 VITALS
TEMPERATURE: 98 F | DIASTOLIC BLOOD PRESSURE: 76 MMHG | OXYGEN SATURATION: 98 % | HEART RATE: 67 BPM | BODY MASS INDEX: 45.99 KG/M2 | HEIGHT: 67 IN | SYSTOLIC BLOOD PRESSURE: 132 MMHG | WEIGHT: 293 LBS

## 2018-09-07 DIAGNOSIS — E11.9 TYPE 2 DIABETES MELLITUS WITHOUT COMPLICATION, WITH LONG-TERM CURRENT USE OF INSULIN: Primary | ICD-10-CM

## 2018-09-07 DIAGNOSIS — E11.65 TYPE 2 DIABETES MELLITUS WITH HYPERGLYCEMIA, WITH LONG-TERM CURRENT USE OF INSULIN: ICD-10-CM

## 2018-09-07 DIAGNOSIS — R29.90: ICD-10-CM

## 2018-09-07 DIAGNOSIS — Z79.4 TYPE 2 DIABETES MELLITUS WITH HYPERGLYCEMIA, WITH LONG-TERM CURRENT USE OF INSULIN: ICD-10-CM

## 2018-09-07 DIAGNOSIS — Z79.4 TYPE 2 DIABETES MELLITUS WITHOUT COMPLICATION, WITH LONG-TERM CURRENT USE OF INSULIN: Primary | ICD-10-CM

## 2018-09-07 DIAGNOSIS — G56.03 BILATERAL CARPAL TUNNEL SYNDROME: ICD-10-CM

## 2018-09-07 LAB — GLUCOSE SERPL-MCNC: 165 MG/DL (ref 70–110)

## 2018-09-07 PROCEDURE — 3078F DIAST BP <80 MM HG: CPT | Mod: CPTII,S$GLB,, | Performed by: FAMILY MEDICINE

## 2018-09-07 PROCEDURE — 99999 PR PBB SHADOW E&M-EST. PATIENT-LVL III: CPT | Mod: PBBFAC,,, | Performed by: FAMILY MEDICINE

## 2018-09-07 PROCEDURE — 3075F SYST BP GE 130 - 139MM HG: CPT | Mod: CPTII,S$GLB,, | Performed by: FAMILY MEDICINE

## 2018-09-07 PROCEDURE — 3008F BODY MASS INDEX DOCD: CPT | Mod: CPTII,S$GLB,, | Performed by: FAMILY MEDICINE

## 2018-09-07 PROCEDURE — 99214 OFFICE O/P EST MOD 30 MIN: CPT | Mod: S$GLB,,, | Performed by: FAMILY MEDICINE

## 2018-09-07 PROCEDURE — 3045F PR MOST RECENT HEMOGLOBIN A1C LEVEL 7.0-9.0%: CPT | Mod: CPTII,S$GLB,, | Performed by: FAMILY MEDICINE

## 2018-09-07 PROCEDURE — 82948 REAGENT STRIP/BLOOD GLUCOSE: CPT | Mod: S$GLB,,, | Performed by: FAMILY MEDICINE

## 2018-09-07 RX ORDER — INSULIN GLARGINE 100 [IU]/ML
20 INJECTION, SOLUTION SUBCUTANEOUS NIGHTLY
Qty: 6 ML | Refills: 11 | Status: SHIPPED | OUTPATIENT
Start: 2018-09-07 | End: 2019-09-07

## 2018-09-07 NOTE — PATIENT INSTRUCTIONS

## 2018-09-07 NOTE — PROGRESS NOTES
Subjective:       Patient ID: More Madrid is a 38 y.o. female.    Chief Complaint: paperwork; finger/legs tingling; and mouth drooping (left side)    39 yo female here with complaint of twitching to her mouth as well as a sensation of her left side of her mouth drooping. She reports that when she looks in the mirror she can't see in any change in her face but she has the distinct feeling that the left side of her mouth is drooping. During our visit she developed the sensation, and I was unable to find any evidence of asymmetry at rest or with smiling, raising eyebrows, etc.     She also has numbness and tingling in both hands, first 3 fingers that comes and goes. She types often during the day and has symptoms with typing. Has not tried wrist brace or been diagnosed with carpal tunnel before.     She was recently diagnosed with DM2. She reports that the insulin prescribed at last visit has not been started because of a problem at the pharmacy. She has not been able to tolerate metformin due to side effect of diarrhea for which she missed work in the past.     She is requesting disability benefit paperwork be filled out by me. She tells me she has been feeling bad and has been unable to go to work. Unable to elaborate on what in particular has kept her from her from performing her job duties.     CBG in our office today is 135      does not have any pertinent problems on file.  Past Medical History:   Diagnosis Date    Diabetes mellitus     Dysmenorrhea     Herpes simplex virus (HSV) infection     Hypertension     Ovarian cyst     PCOS (polycystic ovarian syndrome)      Past Surgical History:   Procedure Laterality Date    cyst removal from ovary       Family History   Problem Relation Age of Onset    Hypertension Mother     Hypertension Father     Cancer Maternal Aunt         lung, cervical     Social History     Socioeconomic History    Marital status: Single     Spouse name: Not on file    Number of  children: 0    Years of education: Not on file    Highest education level: Not on file   Social Needs    Financial resource strain: Not on file    Food insecurity - worry: Not on file    Food insecurity - inability: Not on file    Transportation needs - medical: Not on file    Transportation needs - non-medical: Not on file   Occupational History    Occupation: [employer]     Employer: [employer]   Tobacco Use    Smoking status: Never Smoker    Smokeless tobacco: Never Used   Substance and Sexual Activity    Alcohol use: Yes     Comment: occ     Drug use: No    Sexual activity: Yes     Partners: Male     Birth control/protection: None   Other Topics Concern    Not on file   Social History Narrative    Lives with mom     Review of Systems   Constitutional: Negative for fatigue and unexpected weight change (gainng weight).   HENT: Negative for hearing loss and sore throat.    Eyes: Negative for pain and visual disturbance.   Respiratory: Negative for cough and shortness of breath.    Cardiovascular: Negative for chest pain and palpitations.   Gastrointestinal: Negative for abdominal pain, constipation and diarrhea.   Genitourinary: Negative for difficulty urinating, dysuria and vaginal discharge.   Musculoskeletal: Negative for arthralgias and myalgias.   Skin: Negative for rash and wound.   Neurological: Negative for facial asymmetry, light-headedness and headaches.        Sensation of facial asymmetry. Reports she has developed a tic that involves her slightly turning her head.    Hematological: Negative.    Psychiatric/Behavioral: Negative for dysphoric mood. The patient is nervous/anxious.        Objective:     Vitals:    09/07/18 1014   BP: 132/76   Pulse: 67   Temp: 97.7 °F (36.5 °C)        Physical Exam   Constitutional: She is oriented to person, place, and time. She appears well-developed and well-nourished.   Morbidly obese; 13 lb weight gain over past month per our records.    HENT:   Head:  Normocephalic and atraumatic.   Right Ear: External ear normal.   Left Ear: External ear normal.   Nose: Nose normal.   Eyes: Conjunctivae and EOM are normal. Pupils are equal, round, and reactive to light. No scleral icterus.   Neurological: She is alert and oriented to person, place, and time. No cranial nerve deficit or sensory deficit. She exhibits normal muscle tone.   Normal gait. No speech abnormality.     No thenar or hypothenar muscle wasting. -tinel's test at wrists.    Skin: Skin is warm and dry. No rash noted.   Psychiatric: She has a normal mood and affect. Her behavior is normal. Judgment and thought content normal.   Appears anxious   Nursing note and vitals reviewed.      Assessment:       1. Type 2 diabetes mellitus without complication, with long-term current use of insulin    2. Bilateral carpal tunnel syndrome    3. Facial neurological complaint    4. Type 2 diabetes mellitus with hyperglycemia, with long-term current use of insulin        Plan:           Problem List Items Addressed This Visit     Type 2 diabetes mellitus with hyperglycemia, with long-term current use of insulin    Current Assessment & Plan     Emphasized importance of starting on her medication. Reordered lantus. Asked patient to contact me immediately if unable to fill         Bilateral carpal tunnel syndrome    Relevant Medications    arm brace (WRIST BRACE MEDIUM) Misc    Facial neurological complaint    Overview     C/o sensation of left sided facial droop with normal neuro exam; also c/o tic--moving head to the side          Current Assessment & Plan     Reassured that no abnormalities found on exam. Monitor symptoms and let me know if any changes; will refer to neuro if continues to be bothersome           Other Visit Diagnoses     Type 2 diabetes mellitus without complication, with long-term current use of insulin    -  Primary    Relevant Medications    insulin glargine (LANTUS SOLOSTAR) 100 unit/mL (3 mL) InPn pen     Other Relevant Orders    POCT Glucose (Completed)

## 2018-09-08 PROBLEM — Z79.4 TYPE 2 DIABETES MELLITUS WITH HYPERGLYCEMIA, WITH LONG-TERM CURRENT USE OF INSULIN: Status: ACTIVE | Noted: 2018-08-02

## 2018-09-08 PROBLEM — R29.90: Status: ACTIVE | Noted: 2018-09-08

## 2018-09-08 PROBLEM — E11.65 TYPE 2 DIABETES MELLITUS WITH HYPERGLYCEMIA, WITH LONG-TERM CURRENT USE OF INSULIN: Status: ACTIVE | Noted: 2018-08-02

## 2018-09-09 NOTE — ASSESSMENT & PLAN NOTE
Emphasized importance of starting on her medication. Reordered lantus. Asked patient to contact me immediately if unable to fill

## 2018-09-09 NOTE — ASSESSMENT & PLAN NOTE
Reassured that no abnormalities found on exam. Monitor symptoms and let me know if any changes; will refer to neuro if continues to be bothersome

## 2018-09-10 ENCOUNTER — TELEPHONE (OUTPATIENT)
Dept: INTERNAL MEDICINE | Facility: CLINIC | Age: 38
End: 2018-09-10

## 2018-09-10 NOTE — TELEPHONE ENCOUNTER
----- Message from Melania Luque sent at 9/10/2018 12:07 PM CDT -----  Contact: self   Patient requesting a call back regarding paperwork. Please call back at [phone].    Thanks,  Melania Luque

## 2018-10-01 ENCOUNTER — TELEPHONE (OUTPATIENT)
Dept: INTERNAL MEDICINE | Facility: CLINIC | Age: 38
End: 2018-10-01

## 2018-10-01 NOTE — TELEPHONE ENCOUNTER
Spoke with Dr. Lara, independent physician reviewer, regarding patient's disability claim. Discussed the details of our office visits since July as well as treatments started and complaints of patient.

## 2018-12-19 ENCOUNTER — PATIENT OUTREACH (OUTPATIENT)
Dept: ADMINISTRATIVE | Facility: HOSPITAL | Age: 38
End: 2018-12-19

## 2018-12-19 DIAGNOSIS — E11.65 TYPE 2 DIABETES MELLITUS WITH HYPERGLYCEMIA, WITH LONG-TERM CURRENT USE OF INSULIN: Primary | ICD-10-CM

## 2018-12-19 DIAGNOSIS — Z79.4 TYPE 2 DIABETES MELLITUS WITH HYPERGLYCEMIA, WITH LONG-TERM CURRENT USE OF INSULIN: Primary | ICD-10-CM

## 2019-04-23 ENCOUNTER — PATIENT OUTREACH (OUTPATIENT)
Dept: ADMINISTRATIVE | Facility: HOSPITAL | Age: 39
End: 2019-04-23

## 2019-05-21 ENCOUNTER — HOSPITAL ENCOUNTER (EMERGENCY)
Facility: HOSPITAL | Age: 39
Discharge: HOME | End: 2019-05-21
Attending: FAMILY MEDICINE
Payer: COMMERCIAL

## 2019-05-21 VITALS
HEIGHT: 67 IN | BODY MASS INDEX: 43.25 KG/M2 | WEIGHT: 275.56 LBS | OXYGEN SATURATION: 98 % | SYSTOLIC BLOOD PRESSURE: 137 MMHG | TEMPERATURE: 98 F | DIASTOLIC BLOOD PRESSURE: 78 MMHG | RESPIRATION RATE: 16 BRPM | HEART RATE: 97 BPM

## 2019-05-21 DIAGNOSIS — R51.9 SINUS HEADACHE: Primary | ICD-10-CM

## 2019-05-21 PROCEDURE — 99284 EMERGENCY DEPT VISIT MOD MDM: CPT

## 2019-05-21 RX ORDER — LORATADINE 10 MG/1
10 TABLET ORAL DAILY
Qty: 14 TABLET | Refills: 0 | Status: SHIPPED | OUTPATIENT
Start: 2019-05-21 | End: 2019-06-04

## 2019-05-21 RX ORDER — FLUTICASONE PROPIONATE 50 MCG
1 SPRAY, SUSPENSION (ML) NASAL 2 TIMES DAILY PRN
Qty: 15 G | Refills: 0 | Status: SHIPPED | OUTPATIENT
Start: 2019-05-21

## 2019-05-21 RX ORDER — BUTALBITAL, ACETAMINOPHEN AND CAFFEINE 50; 325; 40 MG/1; MG/1; MG/1
1 TABLET ORAL EVERY 4 HOURS PRN
Qty: 15 TABLET | Refills: 0 | Status: SHIPPED | OUTPATIENT
Start: 2019-05-21 | End: 2019-06-20

## 2019-05-21 NOTE — ED PROVIDER NOTES
SCRIBE #1 NOTE: I, Prema Carlson, am scribing for, and in the presence of, Nora Luna NP. I have scribed the entire note.      History      Chief Complaint   Patient presents with    Headache     headaches and nose bleeds x3 days       Review of patient's allergies indicates:  No Known Allergies     HPI   HPI    5/21/2019, 6:28 PM   History obtained from the patient      History of Present Illness: More Madrid is a 39 y.o. female patient with PMHx of DM, Dysmenorrhea, HSV, and HTN who presents to the Emergency Department for frontal headache which onset gradually 3 days ago. Symptoms are constant and moderate in severity. Pt also c/o multiple episodes of epistaxis. No mitigating or exacerbating factors reported. Patient denies fever, chills, cough, congestion, dizziness, vision changes, speech difficulty, neck pain or stiffness, nausea, vomiting, or numbness, tingling, or weakness to the extremities, and all other sxs at this time. Pt reports no recent head trauma. Pt reports taking Amlodipine for her BP. No further complaints or concerns at this time.     Arrival mode: Personal vehicle      PCP: Beba Luu MD       Past Medical History:  Past Medical History:   Diagnosis Date    Diabetes mellitus     Dysmenorrhea     Herpes simplex virus (HSV) infection     Hypertension     Ovarian cyst     PCOS (polycystic ovarian syndrome)        Past Surgical History:  Past Surgical History:   Procedure Laterality Date    cyst removal from ovary           Family History:  Family History   Problem Relation Age of Onset    Hypertension Mother     Hypertension Father     Cancer Maternal Aunt         lung, cervical       Social History:  Social History     Tobacco Use    Smoking status: Never Smoker    Smokeless tobacco: Never Used   Substance and Sexual Activity    Alcohol use: Yes     Comment: occ     Drug use: No    Sexual activity: Yes     Partners: Male     Birth control/protection: None       ROS    Review of Systems   Constitutional: Negative for chills, diaphoresis and fever.   HENT: Positive for nosebleeds. Negative for congestion, ear discharge, ear pain and sore throat.    Eyes: Negative for visual disturbance.   Respiratory: Negative for cough and shortness of breath.    Cardiovascular: Negative for chest pain.   Gastrointestinal: Negative for abdominal pain, diarrhea, nausea and vomiting.   Musculoskeletal: Negative for back pain and neck pain.   Skin: Negative for rash.   Neurological: Positive for headaches. Negative for dizziness, syncope, facial asymmetry, speech difficulty, weakness and numbness.   All other systems reviewed and are negative.    Physical Exam      Initial Vitals [05/21/19 1819]   BP Pulse Resp Temp SpO2   137/78 97 16 98.4 °F (36.9 °C) 98 %      MAP       --          Physical Exam  Nursing Notes and Vital Signs Reviewed.  Constitutional: Patient is in no acute distress. Well-developed and well-nourished.  Head: Atraumatic. Normocephalic.  Eyes: PERRL. EOM intact. Conjunctivae are not pale. No scleral icterus.  ENT: Mucous membranes are moist. Oropharynx is clear and symmetric. No septal hematoma. No active bleeding. Mild nasal mucosal edema.  Neck: Supple. Full ROM. No lymphadenopathy.  Cardiovascular: Regular rate. Regular rhythm. No murmurs, rubs, or gallops. Distal pulses are 2+ and symmetric.  Pulmonary/Chest: No respiratory distress. Clear to auscultation bilaterally. No wheezing or rales.  Abdominal: Soft and non-distended.  There is no tenderness.  No rebound, guarding, or rigidity. Good bowel sounds.  Genitourinary: No CVA tenderness  Musculoskeletal: Moves all extremities. No obvious deformities. No edema. No calf tenderness.  Skin: Warm and dry.  Neurological: Patient is alert and oriented to person, place and time. Pupils ERRL and EOM normal. Cranial nerves II-XII are intact. Strength is full bilaterally; it is equal and 5/5 in bilateral upper and lower extremities.  "There is no pronator drift of outstretched arms. Light touch sense is intact. Speech is clear and normal. No acute focal neurological deficits noted.  Psychiatric: Normal affect. Good eye contact. Appropriate in content.    ED Course    Procedures  ED Vital Signs:  Vitals:    05/21/19 1819   BP: 137/78   Pulse: 97   Resp: 16   Temp: 98.4 °F (36.9 °C)   TempSrc: Oral   SpO2: 98%   Weight: 125 kg (275 lb 9.2 oz)   Height: 5' 7" (1.702 m)       Abnormal Lab Results:  Labs Reviewed   HIV 1 / 2 ANTIBODY             The Emergency Provider reviewed the vital signs and test results, which are outlined above.    ED Discussion     6:36 PM Pt is awake, alert, and in NAD at this time. Discussed with pt all pertinent ED information and results. Discussed pt dx and plan of tx. Gave pt all f/u and return to the ED instructions. All questions and concerns were addressed at this time. Pt expresses understanding of information and instructions, and is comfortable with plan to discharge. Pt is stable for discharge.      Patient's headache is either consistent with previous headache and/or lacks features concerning for emergent or life threatening condition.  I do not suspect SAH, meningitis, increased IC pressure, infectious, toxic, vascular, CNS, or other EMC.  I have discussed this at length with patient and/or family/caretaker.      ED Medication(s):  Medications - No data to display    New Prescriptions    BUTALBITAL-ACETAMINOPHEN-CAFFEINE -40 MG (FIORICET, ESGIC) -40 MG PER TABLET    Take 1 tablet by mouth every 4 (four) hours as needed for Pain.    FLUTICASONE PROPIONATE (FLONASE) 50 MCG/ACTUATION NASAL SPRAY    1 spray (50 mcg total) by Each Nare route 2 (two) times daily as needed for Rhinitis or Allergies.    LORATADINE (CLARITIN) 10 MG TABLET    Take 1 tablet (10 mg total) by mouth once daily. for 14 days       Follow-up Information     Beba Luu MD In 2 days.    Specialty:  Family Medicine  Why:  " Follow up with your doctor for further evaluation sinus infection., Return to ED for any concerns.  Contact information:  [address]  [place] [id number] [phone]                     Medical Decision Making    Medical Decision Making:   Clinical Tests:   Lab Tests: Ordered           Scribe Attestation:   Scribe #1: I performed the above scribed service and the documentation accurately describes the services I performed. I attest to the accuracy of the note.    Attending:   Physician Attestation Statement for Scribe #1: I, Nora Luna NP, personally performed the services described in this documentation, as scribed by Prema Carlson, in my presence, and it is both accurate and complete.          Clinical Impression       ICD-10-CM ICD-9-CM   1. Sinus headache R51 784.0       Disposition:   Disposition: Discharged  Condition: Stable         Nora Luna NP  05/21/19 2318

## 2019-05-21 NOTE — DISCHARGE INSTRUCTIONS
Return to the ED immediately for any worsening of symptoms, fever greater than 100.4, vomiting, worsening headaches, change in vision, confusion, seizures or seizure like activity, dizziness, weakness or any concerns.

## 2019-05-23 ENCOUNTER — PATIENT OUTREACH (OUTPATIENT)
Dept: ADMINISTRATIVE | Facility: HOSPITAL | Age: 39
End: 2019-05-23

## 2019-05-23 NOTE — PROGRESS NOTES
"Contacted patient to schedule with Dr. Luu.  Patient states, " I have been to Dr. Luu in some months, I actually have a new PCP at the [facility].  "

## 2020-01-07 ENCOUNTER — HOSPITAL ENCOUNTER (EMERGENCY)
Facility: HOSPITAL | Age: 40
Discharge: HOME | End: 2020-01-07
Attending: EMERGENCY MEDICINE
Payer: COMMERCIAL

## 2020-01-07 VITALS
RESPIRATION RATE: 16 BRPM | SYSTOLIC BLOOD PRESSURE: 158 MMHG | BODY MASS INDEX: 45.99 KG/M2 | DIASTOLIC BLOOD PRESSURE: 78 MMHG | HEIGHT: 67 IN | TEMPERATURE: 98 F | OXYGEN SATURATION: 96 % | HEART RATE: 78 BPM | WEIGHT: 293 LBS

## 2020-01-07 DIAGNOSIS — R05.9 COUGH: ICD-10-CM

## 2020-01-07 DIAGNOSIS — M94.0 COSTOCHONDRITIS, ACUTE: Primary | ICD-10-CM

## 2020-01-07 PROCEDURE — 99284 EMERGENCY DEPT VISIT MOD MDM: CPT

## 2020-01-07 RX ORDER — NAPROXEN 500 MG/1
500 TABLET ORAL 2 TIMES DAILY WITH MEALS
Qty: 20 TABLET | Refills: 0 | Status: SHIPPED | OUTPATIENT
Start: 2020-01-07 | End: 2020-01-17

## 2020-01-07 RX ORDER — BENZONATATE 100 MG/1
100 CAPSULE ORAL 3 TIMES DAILY PRN
Qty: 20 CAPSULE | Refills: 0 | Status: SHIPPED | OUTPATIENT
Start: 2020-01-07 | End: 2020-01-17

## 2020-01-07 NOTE — DISCHARGE INSTRUCTIONS
Discussed with patient several reasons for coughing (i.e., sinusitis, asthma, COPD, GERD, exposure to smoke or allergens, and infection). I provided tips to the patient to help ease coughing episodes such as: sucking on cough drops or hard candy; using a vaporizer or taking a steamy shower to increase the moisture in the air and help soothe a dry throat; drinking plenty of fluids; and speaking with pharmacist or healthcare team before taking OTC medications due to side effects and possible medication incompatibilities.     Rest  Drink plenty of clear fluids   Nasal saline spray to clear nasal drainage and help with nasal congestion  Zyrtec or Claritin to help dry mucus and post nasal drip  Mucinex or Mucinex DM for cough and chest congestion  Tylenol or Ibuprofen for fever, headache and body aches  Warm salt water gargles for throat comfort  Chloraseptic spray or lozenges for throat comfort  RTC with no improvement or worsening

## 2020-01-07 NOTE — ED PROVIDER NOTES
SCRIBE #1 NOTE: I, Zev Broussard, am scribing for, and in the presence of, Porter Chung Jr., MD. I have scribed the entire note.      History      Chief Complaint   Patient presents with    Cough     pt c/o cough, chest wall pain from coughing, and congestion, x2 weeks       Review of patient's allergies indicates:  No Known Allergies     HPI   HPI    1/7/2020, 8:57 AM   History obtained from the patient      History of Present Illness: More Madrid is a 39 y.o. female patient who presents to the Emergency Department for cough, onset 2 weeks PTA. Symptoms are episodic and moderate in severity. No mitigating or exacerbating factors reported. Associated sxs include chest wall pain. Patient denies any fever, chills, n/v/d, SOB, weakness, numbness, dizziness, headache, and all other sxs at this time. Prior Tx includes Mucinex, Therflu, and [place]. No further complaints or concerns at this time.       Arrival mode: Personal vehicle    PCP: Pily Cabello MD       Past Medical History:  Past Medical History:   Diagnosis Date    Diabetes mellitus     Dysmenorrhea     Herpes simplex virus (HSV) infection     Hypertension     Ovarian cyst     PCOS (polycystic ovarian syndrome)        Past Surgical History:  Past Surgical History:   Procedure Laterality Date    cyst removal from ovary           Family History:  Family History   Problem Relation Age of Onset    Hypertension Mother     Hypertension Father     Cancer Maternal Aunt         lung, cervical       Social History:  Social History     Tobacco Use    Smoking status: Never Smoker    Smokeless tobacco: Never Used   Substance and Sexual Activity    Alcohol use: Yes     Comment: occ     Drug use: No    Sexual activity: Yes     Partners: Male     Birth control/protection: None       ROS   Review of Systems   Constitutional: Negative for chills, diaphoresis, fatigue and fever.   HENT: Negative for sore throat.    Respiratory: Positive for cough.  "Negative for shortness of breath.    Cardiovascular: Positive for chest pain (chest wall pain).   Gastrointestinal: Negative for diarrhea, nausea and vomiting.   Genitourinary: Negative for dysuria.   Musculoskeletal: Negative for back pain.   Skin: Negative for rash.   Neurological: Negative for dizziness, weakness, light-headedness, numbness and headaches.   Hematological: Does not bruise/bleed easily.   All other systems reviewed and are negative.    Physical Exam      Initial Vitals [01/07/20 0817]   BP Pulse Resp Temp SpO2   (!) 158/78 85 18 98 °F (36.7 °C) 99 %      MAP       --          Physical Exam  Nursing Notes and Vital Signs Reviewed.  Constitutional: Patient is in no acute distress. Well-developed and well-nourished.  Head: Atraumatic. Normocephalic.  Eyes: PERRL. EOM intact. Conjunctivae are not pale. No scleral icterus.  ENT: Mucous membranes are moist. Oropharynx is clear and symmetric.    Neck: Supple. Full ROM. No lymphadenopathy.  Cardiovascular: Regular rate. Regular rhythm. No murmurs, rubs, or gallops. Distal pulses are 2+ and symmetric.  Pulmonary/Chest: Anterior chest wall tenderness. No respiratory distress. Clear to auscultation bilaterally. No wheezing or rales.  Abdominal: Soft and non-distended.  There is no tenderness.  No rebound, guarding, or rigidity. Good bowel sounds.  Genitourinary: No CVA tenderness  Musculoskeletal: Moves all extremities. No obvious deformities. No edema.  Skin: Warm and dry.  Neurological:  Alert, awake, and appropriate.  Normal speech.  No acute focal neurological deficits are appreciated.  Psychiatric: Normal affect. Good eye contact. Appropriate in content.    ED Course    Procedures  ED Vital Signs:  Vitals:    01/07/20 0817 01/07/20 0906   BP: (!) 158/78    Pulse: 85 78   Resp: 18 16   Temp: 98 °F (36.7 °C)    TempSrc: Oral    SpO2: 99% 96%   Weight: (!) 141.3 kg (311 lb 6.4 oz)    Height: 5' 7" (1.702 m)                 The Emergency Provider reviewed " the vital signs and test results, which are outlined above.    ED Discussion     8:59 AM: Discussed with pt all pertinent ED information. Discussed pt dx and plan of tx. Gave pt all f/u and return to the ED instructions. All questions and concerns were addressed at this time. Pt expresses understanding of information and instructions, and is comfortable with plan to discharge. Pt is stable for discharge.    I discussed with patient and/or family/caretaker that evaluation in the ED does not suggest any emergent or life threatening medical conditions requiring immediate intervention beyond what was provided in the ED, and I believe patient is safe for discharge.  Regardless, an unremarkable evaluation in the ED does not preclude the development or presence of a serious of life threatening condition. As such, patient was instructed to return immediately for any worsening or change in current symptoms.    ED Medication(s):  Medications - No data to display    Follow-up Information     Pily Cabello MD. Schedule an appointment as soon as possible for a visit in 1 week.    Specialty:  Internal Medicine  Contact information:  [address] [facility] [address]  [phone]             [facility] - .    Specialty:  Emergency Medicine  Why:  As needed, If symptoms worsen  Contact information:  [address] [address]  [facility] [address] [phone]                Discharge Medication List as of 1/7/2020  9:00 AM      START taking these medications    Details   benzonatate (TESSALON) 100 MG capsule Take 1 capsule (100 mg total) by mouth 3 (three) times daily as needed for Cough., Starting Tue 1/7/2020, Until Fri 1/17/2020, Print      naproxen (EC NAPROSYN) 500 MG EC tablet Take 1 tablet (500 mg total) by mouth 2 (two) times daily with meals. for 10 days, Starting Tue 1/7/2020, Until Fri 1/17/2020, Print               Medical Decision Making              Scribe Attestation:   Scribe #1: I  performed the above scribed service and the documentation accurately describes the services I performed. I attest to the accuracy of the note.    Attending:   Physician Attestation Statement for Scribe #1: I, Porter Chung Jr., MD, personally performed the services described in this documentation, as scribed by Zev Broussard, in my presence, and it is both accurate and complete.          Clinical Impression       ICD-10-CM ICD-9-CM   1. Costochondritis, acute M94.0 733.6   2. Cough R05 786.2       Disposition:   Disposition: Discharged  Condition: Stable         Porter Chung Jr., MD  01/10/20 0621

## 2020-01-07 NOTE — ED NOTES
Patient moved to ED room 07, patient assisted onto stretcher. Patient placed on cardiac monitor, continuous pulse oximetry and automatic blood pressure cuff. Bed placed in low locked position, call light is within reach of patient, orientation to room and explanation of wait provided to patient, alarms set and turned on for monitor and pulse ox, awaiting MD evaluation and orders, will continue to monitor. Patient reports cough for 2 weeks, states it is productive green mucus. Patient reports using several OTC such as mucinex and theraflu. Patient denies any fever, body ache or chills. Patient also reports pain in chest when coughing.     Patient identifies self as More Madrid      LOC: The patient is awake, alert and aware of environment with an appropriate affect, the patient is oriented x 3 and speaking appropriately.  APPEARANCE: Patient resting comfortably and in no acute distress, patient is clean and well groomed, patient's clothing is properly fastened.  SKIN: The skin is warm and dry, color consistent with ethnicity, patient has normal skin turgor and moist mucus membranes, skin intact, no breakdown or bruising noted.  MUSCULOSKELETAL: Patient moving all extremities well, no obvious swelling or deformities noted.  RESPIRATORY: Airway is open and patent, respirations are spontaneous, patient has a normal effort and rate, no accessory muscle use noted.  CARDIAC: Patient has a normal rate and rhythm, no periphreal edema noted, capillary refill < 3 seconds.  ABDOMEN: Soft and non tender to palpation, no distention noted.  NEUROLOGIC: PERRL, 3 mm bilaterally, eyes open spontaneously, behavior appropriate to situation, follows commands, facial expression symmetrical, bilateral hand grasp equal and even, purposeful motor response noted, normal sensation in all extremities when touched with a finger.

## 2020-07-16 ENCOUNTER — PATIENT OUTREACH (OUTPATIENT)
Dept: ADMINISTRATIVE | Facility: HOSPITAL | Age: 40
End: 2020-07-16

## 2021-04-28 ENCOUNTER — PATIENT MESSAGE (OUTPATIENT)
Dept: RESEARCH | Facility: HOSPITAL | Age: 41
End: 2021-04-28

## 2021-06-02 ENCOUNTER — TELEPHONE (OUTPATIENT)
Dept: PAIN MEDICINE | Facility: CLINIC | Age: 41
End: 2021-06-02

## 2022-01-02 NOTE — TELEPHONE ENCOUNTER
Received PA DENIAL from pt's insurance for Lantus Solostar, letter stated reason was d/t pt has not previously tried and failed the suggested alternatives: Basaglar, Levemir, or Tresiba.   Please advise - suggest alternative?   No

## 2024-12-11 NOTE — TELEPHONE ENCOUNTER
----- Message from Beba Luu MD sent at 7/10/2018  3:29 PM CDT -----  I spoke with More on the phone to discuss new diagnosis of diabetes. Referral entered for diabetes education program. Please coordinate this appointment.   Rectal bleeding